# Patient Record
Sex: FEMALE | Race: BLACK OR AFRICAN AMERICAN | Employment: UNEMPLOYED | URBAN - METROPOLITAN AREA
[De-identification: names, ages, dates, MRNs, and addresses within clinical notes are randomized per-mention and may not be internally consistent; named-entity substitution may affect disease eponyms.]

---

## 2020-04-13 ENCOUNTER — VIRTUAL VISIT (OUTPATIENT)
Dept: FAMILY MEDICINE CLINIC | Age: 45
End: 2020-04-13

## 2020-04-13 VITALS — BODY MASS INDEX: 41.83 KG/M2 | WEIGHT: 245 LBS | HEIGHT: 64 IN

## 2020-04-13 DIAGNOSIS — M06.9 RHEUMATOID ARTHRITIS, INVOLVING UNSPECIFIED SITE, UNSPECIFIED RHEUMATOID FACTOR PRESENCE: ICD-10-CM

## 2020-04-13 DIAGNOSIS — J30.9 ALLERGIC RHINITIS, UNSPECIFIED SEASONALITY, UNSPECIFIED TRIGGER: Primary | ICD-10-CM

## 2020-04-13 RX ORDER — METHOTREXATE 2.5 MG/1
TABLET ORAL
COMMUNITY
Start: 2020-03-27

## 2020-04-13 RX ORDER — MONTELUKAST SODIUM 10 MG/1
10 TABLET ORAL DAILY
Qty: 90 TAB | Refills: 0 | Status: SHIPPED | OUTPATIENT
Start: 2020-04-13

## 2020-04-13 RX ORDER — FOLIC ACID 1 MG/1
TABLET ORAL
COMMUNITY
Start: 2020-03-20

## 2020-04-13 NOTE — PROGRESS NOTES
Chief Complaint   Patient presents with    Establish Care    Headache     1. Have you been to the ER, urgent care clinic since your last visit? Hospitalized since your last visit? No    2. Have you seen or consulted any other health care providers outside of the [address] [place] [address] since your last visit? Include any pap smears or colon screening.  No    Health Maintenance Due   Topic Date Due    DTaP/Tdap/Td series (1 - Tdap) 03/23/1994    PAP AKA CERVICAL CYTOLOGY  03/25/2018    Lipid Screen  10/25/2019

## 2020-04-13 NOTE — PROGRESS NOTES
Chief Complaint   Patient presents with    [facility] Care    Headache     Visit was completed today via doxy. me video platform. Pt was seen today to establish care. Pt reports that she has had some cough recently, has taken tylenol with some relief. Pt reports that she was recently diagnosed with RA, is currently on methotrexate and folic acid. Pt is treated by Dr. Radha Hagan. Pt reports that her hands bother her the most.     Pt used to be a patient of Dr. Nydia Cancino. Pt used to be on migraine medication, has not used a long time. Pt reports that current headache feels more like allergies, nasal congestion. Consent: Elena Bernardo, who was seen by synchronous (real-time) audio-video technology, and/or her healthcare decision maker, is aware that this patient-initiated, Telehealth encounter on 4/13/2020 is a billable service, with coverage as determined by her insurance carrier. She is aware that she may receive a bill and has provided verbal consent to proceed: Yes. Assessment & Plan:   Diagnoses and all orders for this visit:    1. Allergic rhinitis, unspecified seasonality, unspecified trigger  -start on medication as written  -     montelukast (SINGULAIR) 10 mg tablet; Take 1 Tab by mouth daily. 2. Rheumatoid arthritis, involving unspecified site, unspecified rheumatoid factor presence ([facility]  -follow up with Dr. Radha Hagan as scheduled, continue on current medications            712  Subjective:   Elena Bernardo is a 50 y.o. female who was seen for Establish Care and Headache      Prior to Admission medications    Medication Sig Start Date End Date Taking? Authorizing Provider   folic acid (FOLVITE) 1 mg tablet TAKE 3 TABLETS BY MOUTH ONCE DAILY 3/20/20  Yes Provider, Historical   methotrexate (RHEUMATREX) 2.5 mg tablet TAKE 10 TABLETS BY MOUTH ONCE A WEEK 3/27/20  Yes Provider, Historical   montelukast (SINGULAIR) 10 mg tablet Take 1 Tab by mouth daily.  4/13/20  Yes Arielle Quispe, MD   ibuprofen (MOTRIN) 800 mg tablet Take 1 Tab by mouth every eight (8) hours. 5/5/16 4/13/20  Katie Nash MD   oxyCODONE-acetaminophen (PERCOCET) 5-325 mg per tablet Take 1 Tab by mouth every four (4) hours as needed. Max Daily Amount: 6 Tabs. 5/5/16 4/13/20  Katie Nash MD   CALCIUM CARBONATE/VITAMIN D3 (CALCIUM + D PO) Take  by mouth daily. Provider, Historical   ferrous sulfate (SLOW FE) 142 mg (45 mg iron) ER tablet Take 142 mg by mouth Daily (before breakfast). Indications: IRON DEFICIENCY ANEMIA    Provider, Historical     Allergies   Allergen Reactions    Chlorhexidine Towelette Rash and Itching       Patient Active Problem List   Diagnosis Code    Menorrhagia N92.0       Review of Systems   Constitutional: Negative for chills and fever. HENT: Positive for congestion. Negative for hearing loss. Respiratory: Negative for cough. Cardiovascular: Negative for chest pain and palpitations. Gastrointestinal: Negative for heartburn, nausea and vomiting. Musculoskeletal: Negative for myalgias. Neurological: Negative for dizziness and headaches.            Objective:   Vital Signs: (As obtained by patient/caregiver at home)  Visit Vitals  Ht 5' 4\" (1.626 m)   Wt 245 lb (111.1 kg)   BMI 42.05 kg/m²        [INSTRUCTIONS:  \"[x]\" Indicates a positive item  \"[]\" Indicates a negative item  -- DELETE ALL ITEMS NOT EXAMINED]    Constitutional: [x] Appears well-developed and well-nourished [x] No apparent distress      [] Abnormal -     Mental status: [x] Alert and awake  [x] Oriented to person/place/time [x] Able to follow commands    [] Abnormal -     Eyes:   EOM    [x]  Normal    [] Abnormal -   Sclera  [x]  Normal    [] Abnormal -          Discharge [x]  None visible   [] Abnormal -     HENT: [x] Normocephalic, atraumatic  [] Abnormal -   [x] Mouth/Throat: Mucous membranes are moist    External Ears [x] Normal  [] Abnormal -    Neck: [x] No visualized mass [] Abnormal - Pulmonary/Chest: [x] Respiratory effort normal   [x] No visualized signs of difficulty breathing or respiratory distress        [] Abnormal -      Musculoskeletal:   [x] Normal gait with no signs of ataxia         [x] Normal range of motion of neck        [] Abnormal -     Neurological:        [x] No Facial Asymmetry (Cranial nerve 7 motor function) (limited exam due to video visit)          [x] No gaze palsy        [] Abnormal -          Skin:        [x] No significant exanthematous lesions or discoloration noted on facial skin         [] Abnormal -            Psychiatric:       [x] Normal Affect [] Abnormal -        [x] No Hallucinations    Other pertinent observable physical exam findings:-        We discussed the expected course, resolution and complications of the diagnosis(es) in detail. Medication risks, benefits, costs, interactions, and alternatives were discussed as indicated. I advised her to contact the office if her condition worsens, changes or fails to improve as anticipated. She expressed understanding with the diagnosis(es) and plan. Brett Mccain is a 50 y.o. female being evaluated by a video visit encounter for concerns as above. A caregiver was present when appropriate. Due to this being a TeleHealth encounter (During NKLQD-02 public health emergency), evaluation of the following organ systems was limited: Vitals/Constitutional/EENT/Resp/CV/GI//MS/Neuro/Skin/Heme-Lymph-Imm. Pursuant to the emergency declaration under the [facility], 1135 waiver authority and the [employer] General Act, this Virtual  Visit was conducted, with patient's (and/or legal guardian's) consent, to reduce the patient's risk of exposure to COVID-19 and provide necessary medical care. Services were provided through a video synchronous discussion virtually to substitute for in-person clinic visit.    Patient and provider were located at their individual homes.         Abdoul Flores MD

## 2020-07-28 ENCOUNTER — TELEPHONE (OUTPATIENT)
Dept: FAMILY MEDICINE CLINIC | Age: 45
End: 2020-07-28

## 2020-07-30 NOTE — TELEPHONE ENCOUNTER
Patient declined doing a virtual visit, she wanted to come in to be seen. I told her [facility] was booked for today but I could schedule her a virtual visit. She stated. \"no and hung up\".

## 2020-08-02 NOTE — TELEPHONE ENCOUNTER
Patient has a rash on her back, arms, stomach, hands. It comes and goes. She wants to see a provider. 29.9

## 2020-11-12 ENCOUNTER — OFFICE VISIT (OUTPATIENT)
Dept: FAMILY MEDICINE CLINIC | Age: 45
End: 2020-11-12
Payer: MEDICAID

## 2020-11-12 VITALS
DIASTOLIC BLOOD PRESSURE: 87 MMHG | WEIGHT: 246 LBS | TEMPERATURE: 97.8 F | BODY MASS INDEX: 42 KG/M2 | RESPIRATION RATE: 18 BRPM | SYSTOLIC BLOOD PRESSURE: 137 MMHG | HEIGHT: 64 IN | HEART RATE: 72 BPM | OXYGEN SATURATION: 97 %

## 2020-11-12 DIAGNOSIS — Z23 NEEDS FLU SHOT: Primary | ICD-10-CM

## 2020-11-12 DIAGNOSIS — E66.01 OBESITY, MORBID (HCC): ICD-10-CM

## 2020-11-12 DIAGNOSIS — E78.2 MIXED HYPERLIPIDEMIA: ICD-10-CM

## 2020-11-12 PROCEDURE — 99214 OFFICE O/P EST MOD 30 MIN: CPT | Performed by: FAMILY MEDICINE

## 2020-11-12 RX ORDER — LEFLUNOMIDE 20 MG/1
TABLET ORAL
COMMUNITY
Start: 2020-10-24

## 2020-11-12 RX ORDER — ATORVASTATIN CALCIUM 10 MG/1
10 TABLET, FILM COATED ORAL DAILY
Qty: 90 TAB | Refills: 1 | Status: SHIPPED | OUTPATIENT
Start: 2020-11-12

## 2020-11-12 NOTE — PROGRESS NOTES
Chief Complaint   Patient presents with    Cholesterol Problem     Pt is concerned about cholesterol report from Dr. Arrie Cushing. Pt was found to have LDL of 142. Pt reports that she does have a family history of elevated cholesterol she thinks in her mother. Pt agrees to a flu vaccine today. Pt reports that she is doing well otherwise. Subjective: (As above and below)     Chief Complaint   Patient presents with    Cholesterol Problem     she is a 50y.o. year old female who presents for evaluation. Reviewed PmHx, RxHx, FmHx, SocHx, AllgHx and updated in chart. Review of Systems - negative except as listed above    Objective:     Vitals:    11/12/20 0936   BP: 137/87   Pulse: 72   Resp: 18   Temp: 97.8 °F (36.6 °C)   TempSrc: Skin   SpO2: 97%   Weight: 246 lb (111.6 kg)   Height: 5' 4\" (1.626 m)     Physical Examination: General appearance - alert, well appearing, and in no distress  Mental status - normal mood, behavior, speech, dress, motor activity, and thought processes  Ears - bilateral TM's and external ear canals normal  Chest - clear to auscultation, no wheezes, rales or rhonchi, symmetric air entry  Heart - normal rate, regular rhythm, normal S1, S2, no murmurs, rubs, clicks or gallops  Musculoskeletal - no joint tenderness, deformity or swelling  Extremities - peripheral pulses normal, no pedal edema, no clubbing or cyanosis    Assessment/ Plan:   1. Obesity, morbid ([address]  -continue to work on diet and exercise    2. Needs flu shot  - INFLUENZA VIRUS VAC QUAD,SPLIT,PRESV FREE SYRINGE IM    3. Mixed hyperlipidemia  -start on lipitor as written, information provided   - atorvastatin (LIPITOR) 10 mg tablet; Take 1 Tab by mouth daily. Dispense: 90 Tab; Refill: 1      I have discussed the diagnosis with the patient and the intended plan as seen in the above orders. The patient has received an after-visit summary and questions were answered concerning future plans.      Medication Side Effects and Warnings were discussed with patient: yes  Patient Labs were reviewed: yes  Patient Past Records were reviewed:  yes    Serene Cheung M.D.

## 2020-11-12 NOTE — PATIENT INSTRUCTIONS
Vaccine Information Statement Influenza (Flu) Vaccine (Inactivated or Recombinant): What You Need to Know Many Vaccine Information Statements are available in Persian and other languages. See www.immunize.org/vis Hojas de información sobre vacunas están disponibles en español y en muchos otros idiomas. Visite www.immunize.org/vis 1. Why get vaccinated? Influenza vaccine can prevent influenza (flu). Flu is a contagious disease that spreads around the [facility] every year, usually between October and May. Anyone can get the flu, but it is more dangerous for some people. Infants and young children, people 72years of age and older, pregnant women, and people with certain health conditions or a weakened immune system are at greatest risk of flu complications. Pneumonia, bronchitis, sinus infections and ear infections are examples of flu-related complications. If you have a medical condition, such as heart disease, cancer or diabetes, flu can make it worse. Flu can cause fever and chills, sore throat, muscle aches, fatigue, cough, headache, and runny or stuffy nose. Some people may have vomiting and diarrhea, though this is more common in children than adults. Each year thousands of people in the [facility] die from flu, and many more are hospitalized. Flu vaccine prevents millions of illnesses and flu-related visits to the doctor each year. 2. Influenza vaccines CDC recommends everyone 10months of age and older get vaccinated every flu season. Children 6 months through 6years of age may need 2 doses during a single flu season. Everyone else needs only 1 dose each flu season. It takes about 2 weeks for protection to develop after vaccination. There are many flu viruses, and they are always changing. Each year a new flu vaccine is made to protect against three or four viruses that are likely to cause disease in the upcoming flu season.  Even when the vaccine doesnt exactly match these viruses, it may still provide some protection. Influenza vaccine does not cause flu. Influenza vaccine may be given at the same time as other vaccines. 3. Talk with your health care provider Tell your vaccine provider if the person getting the vaccine: 
 Has had an allergic reaction after a previous dose of influenza vaccine, or has any severe, life-threatening allergies.  Has ever had Guillain-Barré Syndrome (also called GBS). In some cases, your health care provider may decide to postpone influenza vaccination to a future visit. People with minor illnesses, such as a cold, may be vaccinated. People who are moderately or severely ill should usually wait until they recover before getting influenza vaccine. Your health care provider can give you more information. 4. Risks of a reaction  Soreness, redness, and swelling where shot is given, fever, muscle aches, and headache can happen after influenza vaccine.  There may be a very small increased risk of Guillain-Barré Syndrome (GBS) after inactivated influenza vaccine (the flu shot). [employer] children who get the flu shot along with pneumococcal vaccine (PCV13), and/or DTaP vaccine at the same time might be slightly more likely to have a seizure caused by fever. Tell your health care provider if a child who is getting flu vaccine has ever had a seizure. People sometimes faint after medical procedures, including vaccination. Tell your provider if you feel dizzy or have vision changes or ringing in the ears. As with any medicine, there is a very remote chance of a vaccine causing a severe allergic reaction, other serious injury, or death. 5. What if there is a serious problem? An allergic reaction could occur after the vaccinated person leaves the clinic.  If you see signs of a severe allergic reaction (hives, swelling of the face and throat, difficulty breathing, a fast heartbeat, dizziness, or weakness), call 9-1-1 and get the person to the nearest hospital. 
 
For other signs that concern you, call your health care provider. Adverse reactions should be reported to the Vaccine Adverse Event Reporting System (VAERS). Your health care provider will usually file this report, or you can do it yourself. Visit the VAERS website at www.vaers. hhs.gov or call 2-200.699.6213. VAERS is only for reporting reactions, and VAERS staff do not give medical advice. 6. The National Vaccine Injury Compensation Program 
 
The [facility] Vaccine Injury Compensation Program (VICP) is a federal program that was created to compensate people who may have been injured by certain vaccines. Visit the VICP website at www.[facility].gov/vaccinecompensation or call 2-102.168.5047 to learn about the program and about filing a claim. There is a time limit to file a claim for compensation. 7. How can I learn more?  Ask your health care provider.  Call your local or state health department.  Contact the Centers for Disease Control and Prevention (CDC): 
- Call 8-428.379.4580 (1-800-CDC-INFO) or 
- Visit CDCs influenza website at www.cdc.gov/flu Vaccine Information Statement (Interim) Inactivated Influenza Vaccine 8/15/2019 
[address] CATHLE[address] Marvin [id number] Department of Health and [employer] Centers for Disease Control and Prevention Office Use Only High Cholesterol: Care Instructions Your Care Instructions Cholesterol is a type of fat in your blood. It is needed for many body functions, such as making new cells. Cholesterol is made by your body. It also comes from food you eat. High cholesterol means that you have too much of the fat in your blood. This raises your risk of a heart attack and stroke. LDL and HDL are part of your total cholesterol. LDL is the \"bad\" cholesterol. High LDL can raise your risk for heart disease, heart attack, and stroke. HDL is the \"good\" cholesterol.  It helps clear bad cholesterol from the body. High HDL is linked with a lower risk of heart disease, heart attack, and stroke. Your cholesterol levels help your doctor find out your risk for having a heart attack or stroke. You and your doctor can talk about whether you need to lower your risk and what treatment is best for you. A heart-healthy lifestyle along with medicines can help lower your cholesterol and your risk. The way you choose to lower your risk will depend on how high your risk is for heart attack and stroke. It will also depend on how you feel about taking medicines. Follow-up care is a key part of your treatment and safety. Be sure to make and go to all appointments, and call your doctor if you are having problems. It's also a good idea to know your test results and keep a list of the medicines you take. How can you care for yourself at home? · Eat a variety of foods every day. Good choices include fruits, vegetables, whole grains (like oatmeal), dried beans and peas, nuts and seeds, soy products (like tofu), and fat-free or low-fat dairy products. · Replace butter, margarine, and hydrogenated or partially hydrogenated oils with olive and canola oils. (Canola oil margarine without trans fat is fine.) · Replace red meat with fish, poultry, and soy protein (like tofu). · Limit processed and packaged foods like chips, crackers, and cookies. · Bake, broil, or steam foods. Don't contreras them. · Be physically active. Get at least 30 minutes of exercise on most days of the week. Walking is a good choice. You also may want to do other activities, such as running, swimming, cycling, or playing tennis or team sports. · Stay at a healthy weight or lose weight by making the changes in eating and physical activity listed above. Losing just a small amount of weight, even 5 to 10 pounds, can reduce your risk for having a heart attack or stroke. · Do not smoke. When should you call for help? Watch closely for changes in your health, and be sure to contact your doctor if: 
  · You need help making lifestyle changes.  
  · You have questions about your medicine. Where can you learn more? Go to http://www.gray.com/ Enter M367 in the search box to learn more about \"High Cholesterol: Care Instructions. \" Current as of: December 16, 2019               Content Version: 12.6 © 1811-5508 [employer]. Care instructions adapted under license by [employer] (which disclaims liability or warranty for this information). If you have questions about a medical condition or this instruction, always ask your healthcare professional. [address] any warranty or liability for your use of this information. Cholesterol and Triglycerides Tests: About These Tests What are they? Cholesterol and triglycerides tests measure the amount of fats in your blood. These fats have both \"good\" (HDL) and \"bad\" (LDL) cholesterol. Why are these tests done? These tests are done to help find out your risk of a heart attack and stroke. Your doctor uses your cholesterol levels plus other things such as blood pressure, age, and sex to calculate your risk. These tests also help your doctor find out how well medicine is working for some health problems. How do you prepare for these tests? · Your doctor may tell you to fast before your tests. This means that you do not eat or drink anything except water for 9 to 12 hours before the tests. In most cases, you can take your medicines with water the morning of the test. 
· Do not eat high-fat foods the night before the tests. · Do not drink alcohol or do intense exercise the night before the tests. · Tell your doctor ALL the medicines, vitamins, supplements, and herbal remedies you take.  Some may increase the risk of problems during your test. Your doctor will tell you if you should stop taking any of them before the test and how soon to do it. How are these tests done? A health professional uses a needle to take a blood sample, usually from the arm. Where can you learn more? Go to http://www.gray.com/ Enter M891 in the search box to learn more about \"Cholesterol and Triglycerides Tests: About These Tests. \" Current as of: December 16, 2019               Content Version: 12.6 © 8685-5943 [employer]. Care instructions adapted under license by [employer] (which disclaims liability or warranty for this information). If you have questions about a medical condition or this instruction, always ask your healthcare professional. [address] any warranty or liability for your use of this information.

## 2020-11-12 NOTE — PROGRESS NOTES
Anaya Marina is a 50 y.o. female   Chief Complaint   Patient presents with    Cholesterol Problem     1. Have you been to the ER, urgent care clinic since your last visit? Hospitalized since your last visit?no    2. Have you seen or consulted any other health care providers outside of the [address] [place] [address] since your last visit? Dr Lester Hicks any pap smears or colon screening. No  Health Maintenance   Topic Date Due    PAP AKA CERVICAL CYTOLOGY  03/25/2018    Lipid Screen  10/25/2019    Flu Vaccine (1) 09/01/2020    DTaP/Tdap/Td series (2 - Td) 01/01/2026    Pneumococcal 0-64 years  Aged Out     Visit Vitals  /87 (BP 1 Location: Left arm, BP Patient Position: At rest)   Pulse 72   Temp 97.8 °F (36.6 °C) (Skin)   Resp 18   Ht 5' 4\" (1.626 m)   Wt 246 lb (111.6 kg)   SpO2 97%   BMI 42.23 kg/m²         .

## 2020-11-23 PROCEDURE — 90686 IIV4 VACC NO PRSV 0.5 ML IM: CPT | Performed by: FAMILY MEDICINE

## 2020-11-23 PROCEDURE — 90471 IMMUNIZATION ADMIN: CPT | Performed by: FAMILY MEDICINE

## 2021-03-11 ENCOUNTER — TELEPHONE (OUTPATIENT)
Dept: FAMILY MEDICINE CLINIC | Age: 46
End: 2021-03-11

## 2021-03-11 DIAGNOSIS — Z20.1 EXPOSURE TO TB: Primary | ICD-10-CM

## 2021-03-11 NOTE — TELEPHONE ENCOUNTER
Pt is calling wanting to get an order for TB wants sent to [facility] in [address] [facility]

## 2021-03-18 LAB
GAMMA INTERFERON BACKGROUND BLD IA-ACNC: 0.12 IU/ML
M TB IFN-G BLD-IMP: NEGATIVE
M TB IFN-G CD4+ BCKGRND COR BLD-ACNC: 0.14 IU/ML
MITOGEN IGNF BLD-ACNC: >10 IU/ML
QUANTIFERON INCUBATION, QF1T: NORMAL
QUANTIFERON TB2 AG: 0.11 IU/ML
SERVICE CMNT-IMP: NORMAL

## 2021-03-25 ENCOUNTER — OFFICE VISIT (OUTPATIENT)
Dept: FAMILY MEDICINE CLINIC | Age: 46
End: 2021-03-25
Payer: MEDICAID

## 2021-03-25 VITALS
OXYGEN SATURATION: 96 % | HEIGHT: 64 IN | RESPIRATION RATE: 17 BRPM | TEMPERATURE: 97.3 F | WEIGHT: 235 LBS | BODY MASS INDEX: 40.12 KG/M2 | HEART RATE: 80 BPM | DIASTOLIC BLOOD PRESSURE: 85 MMHG | SYSTOLIC BLOOD PRESSURE: 135 MMHG

## 2021-03-25 DIAGNOSIS — E78.2 MIXED HYPERLIPIDEMIA: ICD-10-CM

## 2021-03-25 DIAGNOSIS — E66.01 OBESITY, MORBID (HCC): Primary | ICD-10-CM

## 2021-03-25 PROCEDURE — 99214 OFFICE O/P EST MOD 30 MIN: CPT | Performed by: FAMILY MEDICINE

## 2021-03-25 NOTE — PROGRESS NOTES
Chief Complaint   Patient presents with    Cholesterol Problem     follow up    Suture Removal     Pt is here today for a fasting follow up on her cholesterol and other labs. Pt had a biopsy 8 days ago, with her dermatologist, needs a suture removed. Pt sees a rheumatologist, was advised that her LFTs were slightly elevated. Subjective: (As above and below)     Chief Complaint   Patient presents with    Cholesterol Problem     follow up    Suture Removal     she is a 48y.o. year old female who presents for evaluation. Reviewed PmHx, RxHx, FmHx, SocHx, AllgHx and updated in chart. Review of Systems - negative except as listed above    Objective:     Vitals:    03/25/21 0925   BP: 135/85   Pulse: 80   Resp: 17   Temp: 97.3 °F (36.3 °C)   TempSrc: Temporal   SpO2: 96%   Weight: 235 lb (106.6 kg)   Height: 5' 4\" (1.626 m)     Physical Examination: General appearance - alert, well appearing, and in no distress  Mental status - normal mood, behavior, speech, dress, motor activity, and thought processes  Ears - bilateral TM's and external ear canals normal  Chest - clear to auscultation, no wheezes, rales or rhonchi, symmetric air entry  Heart - normal rate, regular rhythm, normal S1, S2, no murmurs, rubs, clicks or gallops  Musculoskeletal - no joint tenderness, deformity or swelling  Extremities - peripheral pulses normal, no pedal edema, no clubbing or cyanosis  Skin - healing well - suture removed  Assessment/ Plan:   1. Obesity, morbid ([address]  Continue to work on diet and exercise    2. Mixed hyperlipidemia  -check labs  - METABOLIC PANEL, COMPREHENSIVE; Future  - CBC W/O DIFF; Future  - LIPID PANEL; Future  - HEMOGLOBIN A1C WITH EAG; Future  - TSH 3RD GENERATION; Future  - METABOLIC PANEL, COMPREHENSIVE  - CBC W/O DIFF  - LIPID PANEL  - HEMOGLOBIN A1C WITH EAG  - TSH 3RD GENERATION  - HEPATITIS C AB; Future  - HEPATITIS C AB    3.  Suture removal  -one stitch removed       I have discussed the diagnosis with the patient and the intended plan as seen in the above orders. The patient has received an after-visit summary and questions were answered concerning future plans.      Medication Side Effects and Warnings were discussed with patient: yes  Patient Labs were reviewed: yes  Patient Past Records were reviewed:  yes    Thierry Villaseñor M.D.

## 2021-03-25 NOTE — PROGRESS NOTES
1. Have you been to the ER, urgent care clinic since your last visit? Hospitalized since your last visit? No    2. Have you seen or consulted any other health care providers outside of the [address] [place] [address] since your last visit? Include any pap smears or colon screening.  NO    Health Maintenance Due   Topic Date Due    Hepatitis C Screening  Never done    COVID-19 Vaccine (1) Never done

## 2021-03-26 LAB
ALBUMIN SERPL-MCNC: 4.3 G/DL (ref 3.8–4.8)
ALBUMIN/GLOB SERPL: 1.3 {RATIO} (ref 1.2–2.2)
ALP SERPL-CCNC: 72 IU/L (ref 39–117)
ALT SERPL-CCNC: 36 IU/L (ref 0–32)
AST SERPL-CCNC: 20 IU/L (ref 0–40)
BILIRUB SERPL-MCNC: 0.4 MG/DL (ref 0–1.2)
BUN SERPL-MCNC: 6 MG/DL (ref 6–24)
BUN/CREAT SERPL: 9 (ref 9–23)
CALCIUM SERPL-MCNC: 8.8 MG/DL (ref 8.7–10.2)
CHLORIDE SERPL-SCNC: 102 MMOL/L (ref 96–106)
CHOLEST SERPL-MCNC: 202 MG/DL (ref 100–199)
CO2 SERPL-SCNC: 26 MMOL/L (ref 20–29)
CREAT SERPL-MCNC: 0.65 MG/DL (ref 0.57–1)
ERYTHROCYTE [DISTWIDTH] IN BLOOD BY AUTOMATED COUNT: 13.8 % (ref 11.7–15.4)
EST. AVERAGE GLUCOSE BLD GHB EST-MCNC: 120 MG/DL
GLOBULIN SER CALC-MCNC: 3.2 G/DL (ref 1.5–4.5)
GLUCOSE SERPL-MCNC: 99 MG/DL (ref 65–99)
HBA1C MFR BLD: 5.8 % (ref 4.8–5.6)
HCT VFR BLD AUTO: 41.4 % (ref 34–46.6)
HCV AB S/CO SERPL IA: <0.1 S/CO RATIO (ref 0–0.9)
HDLC SERPL-MCNC: 53 MG/DL
HGB BLD-MCNC: 13.8 G/DL (ref 11.1–15.9)
IMP & REVIEW OF LAB RESULTS: NORMAL
LDLC SERPL CALC-MCNC: 132 MG/DL (ref 0–99)
MCH RBC QN AUTO: 29.7 PG (ref 26.6–33)
MCHC RBC AUTO-ENTMCNC: 33.3 G/DL (ref 31.5–35.7)
MCV RBC AUTO: 89 FL (ref 79–97)
PLATELET # BLD AUTO: 237 X10E3/UL (ref 150–450)
POTASSIUM SERPL-SCNC: 3.9 MMOL/L (ref 3.5–5.2)
PROT SERPL-MCNC: 7.5 G/DL (ref 6–8.5)
RBC # BLD AUTO: 4.64 X10E6/UL (ref 3.77–5.28)
SODIUM SERPL-SCNC: 140 MMOL/L (ref 134–144)
TRIGL SERPL-MCNC: 93 MG/DL (ref 0–149)
TSH SERPL DL<=0.005 MIU/L-ACNC: 1.34 UIU/ML (ref 0.45–4.5)
VLDLC SERPL CALC-MCNC: 17 MG/DL (ref 5–40)
WBC # BLD AUTO: 3.1 X10E3/UL (ref 3.4–10.8)

## 2021-04-03 NOTE — PROGRESS NOTES
Cholesterol is elevated, please closely monitor diet and increase exercise, recheck in 6 months. Increase in risk for diabetes, please closely monitor diet and increase exercise   All other labs are within normal limits. A message has been sent in [employer] and the lab work released to the patient.

## 2021-04-13 DIAGNOSIS — G62.9 NEUROPATHY: Primary | ICD-10-CM

## 2021-04-13 RX ORDER — GABAPENTIN 100 MG/1
100 CAPSULE ORAL 3 TIMES DAILY
Qty: 90 CAP | Refills: 1 | Status: SHIPPED | OUTPATIENT
Start: 2021-04-13 | End: 2021-09-27 | Stop reason: ALTCHOICE

## 2021-04-26 ENCOUNTER — TELEPHONE (OUTPATIENT)
Dept: FAMILY MEDICINE CLINIC | Age: 46
End: 2021-04-26

## 2021-04-26 DIAGNOSIS — R05.9 COUGH: Primary | ICD-10-CM

## 2021-04-26 RX ORDER — BENZONATATE 200 MG/1
200 CAPSULE ORAL
Qty: 30 CAP | Refills: 0 | Status: SHIPPED | OUTPATIENT
Start: 2021-04-26 | End: 2021-05-03

## 2021-04-26 NOTE — TELEPHONE ENCOUNTER
----- Message from Emeterio Pena sent at 4/26/2021  8:16 AM EDT -----  Regarding: Non-Urgent Medical Question  Contact: [phone]  I went to the ER on Saturday for cold/ chills and headache,I'm feeling better but I'm coughing off and on so I was wondering if you could call something in to help me with the coughing.     Thank you

## 2021-06-23 ENCOUNTER — OFFICE VISIT (OUTPATIENT)
Dept: NEUROLOGY | Age: 46
End: 2021-06-23
Payer: MEDICAID

## 2021-06-23 VITALS
SYSTOLIC BLOOD PRESSURE: 151 MMHG | HEIGHT: 65 IN | DIASTOLIC BLOOD PRESSURE: 87 MMHG | BODY MASS INDEX: 38.65 KG/M2 | HEART RATE: 69 BPM | WEIGHT: 232 LBS

## 2021-06-23 DIAGNOSIS — E53.8 VITAMIN B12 DEFICIENCY: ICD-10-CM

## 2021-06-23 DIAGNOSIS — R20.2 PARESTHESIA: Primary | ICD-10-CM

## 2021-06-23 DIAGNOSIS — G44.019 EPISODIC CLUSTER HEADACHE, NOT INTRACTABLE: ICD-10-CM

## 2021-06-23 DIAGNOSIS — G62.9 NEUROPATHY: ICD-10-CM

## 2021-06-23 DIAGNOSIS — E55.9 VITAMIN D DEFICIENCY: ICD-10-CM

## 2021-06-23 PROCEDURE — 99205 OFFICE O/P NEW HI 60 MIN: CPT | Performed by: PSYCHIATRY & NEUROLOGY

## 2021-06-23 RX ORDER — MELATONIN
DAILY
COMMUNITY

## 2021-06-23 RX ORDER — LANOLIN ALCOHOL/MO/W.PET/CERES
1000 CREAM (GRAM) TOPICAL DAILY
COMMUNITY

## 2021-06-23 RX ORDER — ERGOCALCIFEROL 1.25 MG/1
50000 CAPSULE ORAL
COMMUNITY
Start: 2021-06-18 | End: 2021-09-10

## 2021-06-23 NOTE — PROGRESS NOTES
Neurology Consult Note      HISTORY PROVIDED BY: patient    Chief Complaint:   Chief Complaint   Patient presents with    New Patient    Tingling      Subjective:    Domingo Taylor is a 48 y.o. right handed female who presents in consultation for numbness and tingling sensation of the lower extremity. This is a 43-year-old right-handed black female with history of rheumatoid arthritis, who was referred to the clinic to evaluate for numbness and tingling sensation of the lower extremity. Patient says she noticed the numbness and tingling sensation of the legs about 2 months ago and since then has been progressive. Patient said while walking on it barefoot sometimes therapy as a result she is walking on carina, she says she also feels as if something is crawling in her arm legs. She says there is no pain, no fall. Initially she thought it would get better but instead it is getting worse. Because of the progression, patient was referred for neurological evaluation. Patient also says she experiences headache, headache is not very frequent, however, when the headache comes it lasts 2 to 3 days. Headache is throbbing in nature, mostly frontal but can be on the sides of the head. She says she has been having this headache for about 3 years. There is occasional dizziness, no nausea or vomiting photophobia phonophobia.   Review of Systems - General ROS: positive for  - fatigue and sleep disturbance  Psychological ROS: positive for - anxiety and sleep disturbances  Ophthalmic ROS: positive for - blurry vision  ENT ROS: positive for - headaches, vertigo and visual changes  Allergy and Immunology ROS: negative  Hematological and Lymphatic ROS: negative  Endocrine ROS: negative  Respiratory ROS: no cough, shortness of breath, or wheezing  Cardiovascular ROS: no chest pain or dyspnea on exertion  Gastrointestinal ROS: no abdominal pain, change in bowel habits, or black or bloody stools  Genito-Urinary ROS: no dysuria, trouble voiding, or hematuria  Musculoskeletal ROS: positive for - joint pain, joint stiffness, muscle pain and muscular weakness  Neurological ROS: positive for - dizziness, headaches, numbness/tingling, visual changes and weakness  Dermatological ROS: negative  Patient says that she recommended to be done by PCP which was said to be 163, if patient will need replacement by injection. Past Medical History:   Diagnosis Date    RA (rheumatoid arthritis) ([facility] [address] [facility]       Past Surgical History:   Procedure Laterality Date    HX GYN      vaginal delivery x2    HX TUBAL LIGATION Bilateral       Social History     Socioeconomic History    Marital status: UNKNOWN     Spouse name: Not on file    Number of children: Not on file    Years of education: Not on file    Highest education level: Not on file   Occupational History    Not on file   Tobacco Use    Smoking status: Never Smoker    Smokeless tobacco: Never Used   Vaping Use    Vaping Use: Never used   Substance and Sexual Activity    Alcohol use: Yes     Comment: occasionally    Drug use: No    Sexual activity: Not Currently   Other Topics Concern    Not on file   Social History Narrative    Not on file     Social Determinants of Health     Financial Resource Strain:     Difficulty of Paying Living Expenses:    Food Insecurity:     Worried About Running Out of Food in the Last Year:     [address] in the Last Year:    Transportation Needs:     Lack of Transportation (Medical):      Lack of Transportation (Non-Medical):    Physical Activity:     Days of Exercise per Week:     Minutes of Exercise per Session:    Stress:     Feeling of Stress :    Social Connections:     Frequency of Communication with Friends and Family:     Frequency of Social Gatherings with Friends and Family:     Attends Denominational Services:     Active Member of Clubs or Organizations:     Attends Club or Organization Meetings:     Marital Status:    Intimate Partner Violence:     Fear of Current or Ex-Partner:     Emotionally Abused:     Physically Abused:     Sexually Abused:      Family History   Problem Relation Age of Onset    Hypertension Mother     Hypertension Father     Cancer Maternal Grandmother     Cancer Maternal Grandfather          Objective:   ROS  As per HPI  Allergies   Allergen Reactions    Chlorhexidine Towelette Rash and Itching        Meds:  Outpatient Medications Prior to Visit   Medication Sig Dispense Refill    ergocalciferol (ERGOCALCIFEROL) 1,250 mcg (50,000 unit) capsule Take 50,000 Units by mouth.  cyanocobalamin (Vitamin B-12) 1,000 mcg tablet Take 1,000 mcg by mouth daily.  cholecalciferol (Vitamin D3) (1000 Units /25 mcg) tablet Take  by mouth daily.  atorvastatin (LIPITOR) 10 mg tablet Take 1 Tab by mouth daily. 90 Tab 1    folic acid (FOLVITE) 1 mg tablet TAKE 3 TABLETS BY MOUTH ONCE DAILY      methotrexate (RHEUMATREX) 2.5 mg tablet TAKE 10 TABLETS BY MOUTH ONCE A WEEK      gabapentin (NEURONTIN) 100 mg capsule Take 1 Cap by mouth three (3) times daily. Max Daily Amount: 300 mg. (Patient not taking: Reported on 6/23/2021) 90 Cap 1    leflunomide (ARAVA) 20 mg tablet TAKE 1 TABLET BY MOUTH ONCE DAILY LABS DUE 4 WEEKS AFTER STARTING (Patient not taking: Reported on 6/23/2021)      montelukast (SINGULAIR) 10 mg tablet Take 1 Tab by mouth daily. (Patient not taking: Reported on 6/23/2021) 90 Tab 0    CALCIUM CARBONATE/VITAMIN D3 (CALCIUM + D PO) Take  by mouth daily. (Patient not taking: Reported on 6/23/2021)      ferrous sulfate (SLOW FE) 142 mg (45 mg iron) ER tablet Take 142 mg by mouth Daily (before breakfast). Indications: IRON DEFICIENCY ANEMIA (Patient not taking: Reported on 6/23/2021)       No facility-administered medications prior to visit. Imaging:  MRI Results (most recent):  No results found for this or any previous visit.      CT Results (most recent):  No results found for this or any previous visit. Reviewed records in [facility] tab today    Lab Review   Results for orders placed or performed in visit on 30/36/54   METABOLIC PANEL, COMPREHENSIVE   Result Value Ref Range    Glucose 99 65 - 99 mg/dL    BUN 6 6 - 24 mg/dL    Creatinine 0.65 0.57 - 1.00 mg/dL    GFR est non- >59 mL/min/1.73    GFR est  >59 mL/min/1.73    BUN/Creatinine ratio 9 9 - 23    Sodium 140 134 - 144 mmol/L    Potassium 3.9 3.5 - 5.2 mmol/L    Chloride 102 96 - 106 mmol/L    CO2 26 20 - 29 mmol/L    Calcium 8.8 8.7 - 10.2 mg/dL    Protein, total 7.5 6.0 - 8.5 g/dL    Albumin 4.3 3.8 - 4.8 g/dL    GLOBULIN, TOTAL 3.2 1.5 - 4.5 g/dL    A-G Ratio 1.3 1.2 - 2.2    Bilirubin, total 0.4 0.0 - 1.2 mg/dL    Alk. phosphatase 72 39 - 117 IU/L    AST (SGOT) 20 0 - 40 IU/L    ALT (SGPT) 36 (H) 0 - 32 IU/L   CBC W/O DIFF   Result Value Ref Range    WBC 3.1 (L) 3.4 - 10.8 x10E3/uL    RBC 4.64 3.77 - 5.28 x10E6/uL    HGB 13.8 11.1 - 15.9 g/dL    HCT 41.4 34.0 - 46.6 %    MCV 89 79 - 97 fL    MCH 29.7 26.6 - 33.0 pg    MCHC 33.3 31.5 - 35.7 g/dL    RDW 13.8 11.7 - 15.4 %    PLATELET 581 355 - 685 x10E3/uL   LIPID PANEL   Result Value Ref Range    Cholesterol, total 202 (H) 100 - 199 mg/dL    Triglyceride 93 0 - 149 mg/dL    HDL Cholesterol 53 >39 mg/dL    VLDL, calculated 17 5 - 40 mg/dL    LDL, calculated 132 (H) 0 - 99 mg/dL   HEMOGLOBIN A1C WITH EAG   Result Value Ref Range    Hemoglobin A1c 5.8 (H) 4.8 - 5.6 %    Estimated average glucose 120 mg/dL   TSH 3RD GENERATION   Result Value Ref Range    TSH 1.340 0.450 - 4.500 uIU/mL   HEPATITIS C AB   Result Value Ref Range    Hep C Virus Ab <0.1 0.0 - 0.9 s/co ratio   CVD REPORT   Result Value Ref Range    INTERPRETATION Note         Exam:  Visit Vitals  BP (!) 151/87   Pulse 69   Ht 5' 5\" (1.651 m)   Wt 232 lb (105.2 kg)   LMP 04/15/2016   BMI 38.61 kg/m²     General:  Alert, cooperative, no distress. Head:  Normocephalic, without obvious abnormality, atraumatic. Respiratory:  Heart:   Non labored breathing  Regular rate and rhythm, no murmurs   Neck:   2+ carotids, no bruits   Extremities: Warm, no cyanosis or edema. Pulses: 2+ radial pulses. Neurologic:  MS: Alert and oriented x 4, speech intact. Language intact, able to name, repeat, and follow all commands. Attention and fund of knowledge appropriate. Recent and remote memory intact. Cranial Nerves:  II: visual fields Full to confrontation   II: pupils Equal, round, reactive to light   II: optic disc No papilledema   III,VII: ptosis none   III,IV,VI: extraocular muscles  EOMI, no nystagmus or diplopia   V: facial light touch sensation  normal   VII: facial muscle function   symmetric   VIII: hearing intact   IX: soft palate elevation  normal   XI: trapezius strength  5/5   XI: sternocleidomastoid strength 5/5   XII: tongue  Midline     Motor: normal bulk and tone, no tremor              Strength: 5/5 throughout, no PD  Sensory: Decreased sensation t to LT, PP, temperature and vibration bilateral lower extremity up to half of the leg  Coordination: FTN and HTS intact, KRISTYN intact,Romberg negative  Gait: normal gait, able to heel, toe, and tandem walk  Reflexes: 1+ symmetric  Plantar: Mute           Assessment/Plan       ICD-10-CM ICD-9-CM    1. Paresthesia  R20.2 782.0 EMG NCV MOTOR WITH F/WAVE PER NERVE   2. Neuropathy  G62.9 355.9 EMG NCV MOTOR WITH F/WAVE PER NERVE   3. Vitamin B12 deficiency  E53.8 266.2    4. Vitamin D deficiency  E55.9 268.9    5. Episodic cluster headache, not intractable  G44.019 339.01 CT HEAD WO CONT   Plan:  Vitamin B12 1000 mcg IM q. weekly for 4 weeks then q. monthly  Head CT to evaluate for this relatively new onset headache  EMG/nerve conduction study bilateral lower extremity. Follow-up and Dispositions    · Return in about 3 months (around 9/23/2021). Thank you very much for this consultation.          Signed:  Clemencia Wiggins MD  6/23/2021

## 2021-06-29 ENCOUNTER — OFFICE VISIT (OUTPATIENT)
Dept: NEUROLOGY | Age: 46
End: 2021-06-29
Payer: MEDICAID

## 2021-06-29 DIAGNOSIS — R20.2 PARESTHESIA: ICD-10-CM

## 2021-06-29 DIAGNOSIS — G62.9 NEUROPATHY: Primary | ICD-10-CM

## 2021-06-29 PROCEDURE — 95886 MUSC TEST DONE W/N TEST COMP: CPT | Performed by: PSYCHIATRY & NEUROLOGY

## 2021-06-29 PROCEDURE — 95910 NRV CNDJ TEST 7-8 STUDIES: CPT | Performed by: PSYCHIATRY & NEUROLOGY

## 2021-06-29 NOTE — PROCEDURES
[facility]  [address], [address], [address]  p: [phone]  f: [phone]    Test Date:  2021    Patient: Satish Stuart : 1975 Physician: Dr. Saul Severance   Sex: Female Height:  cm Ref Phys: Dr. Saul Severance   ID#: [id number] Weight:  lbs. Technician: Justina JIMENEZ     Patient Complaints: Numbness and tingling sensation of the lower extremity, low back pain      Medications: See chart      Patient History / Exam: This is a 53-year-old black female who is being evaluated for numbness and tingling sensation of the extremity, weakness and pain      NCV & EMG Findings:  All nerve conduction studies (as indicated in the following tables) were within normal limits. All examined muscles (as indicated in the following table) showed no evidence of electrical instability. Impression: There is no electrodiagnostic evidence of peripheral neuropathy, however, radiculopathy versus small fiber neuropathy cannot be excluded.       Recommendations: Neuro imaging of the lumbosacral spine suggested      ___________________________  Dr. Saul Severance        Nerve Conduction Studies  Anti Sensory Summary Table     Stim Site NR Peak (ms) Norm Peak (ms) O-P Amp (µV) Norm O-P Amp Site1 Site2 Delta-0 (ms) Dist (cm) Pop (m/s) Norm Pop (m/s)   Left Sup Fibular Anti Sensory (Ant Lat Mall)  33.4°C   14 cm    2.9 <4.4 7.6 >6 14 cm Ant Lat Mall 2.2 10.0 45    Right Sup Fibular Anti Sensory (Ant Lat Mall)  33.4°C   14 cm    3.4 <4.4 6.3 >6 14 cm Ant Lat Mall 2.5 10.0 40    Left Sural Anti Sensory (Lat Mall)  33.4°C   Calf    3.8 <4.4 10.5 >6 Calf Lat Mall 2.8 14.0 50    Right Sural Anti Sensory (Lat Mall)  33.4°C   Calf    3.6 <4.4 6.7 >6 Calf Lat Mall 3.0 14.0 47      Motor Summary Table     Stim Site NR Onset (ms) Norm Onset (ms) O-P Amp (mV) Norm O-P Amp Site1 Site2 Delta-0 (ms) Dist (cm) Pop (m/s) Norm Pop (m/s)   Left Fibular Motor (Ext Dig Brev)  33.4°C   Ankle    4.1 <6.5 6.7 >1.3 B Fib Ankle 5.9 29.0 49 >38   B Fib    10.0  6.2  Poplt B Fib 1.9 10.0 53 >40   Poplt    11.9  5.7          Right Fibular Motor (Ext Dig Brev)  33.4°C   Ankle    4.2 <6.5 10.6 >1.3 B Fib Ankle 6.0 27.5 46 >38   B Fib    10.2  9.8  Poplt B Fib 1.8 10.0 56 >40   Poplt    12.0  9.7          Left Tibial Motor (Abd Vee Brev)  33.4°C   Ankle    3.8 <6.1 12.1 >4.4 Knee Ankle 7.5 39.0 52 >39   Knee    11.3  9.5          Right Tibial Motor (Abd Vee Brev)  33.4°C   Ankle    5.4 <6.1 9.9 >4.4 Knee Ankle 7.6 38.0 50 >39   Knee    13.0  8.8            EMG+     Side Muscle Nerve Root Ins Act Fibs Psw Amp Dur Poly Recrt Int Arno Reamer Comment   Right VastusMed Femoral L2-4 Nml Nml Nml Nml Nml 0 Nml Nml    Right VastusLat Femoral L2-4 Nml Nml Nml Nml Nml 0 Nml Nml    Right QuadratusFem QuadFemoris L4-5, S1 Nml Nml Nml Nml Nml 0 Nml Nml    Right AntTibialis Dp Br Fibular L4-5 Nml Nml Nml Nml Nml 0 Nml Nml    Right Fibularis Long Sup Br Fibular L5-S1 Nml Nml Nml Nml Nml 0 Nml Nml        Nerve Conduction Studies  Anti Sensory Left/Right Comparison     Stim Site L Lat (ms) R Lat (ms) L-R Lat (ms) L Amp (µV) R Amp (µV) L-R Amp (%) Site1 Site2 L Pop (m/s) R Pop (m/s) L-R Pop (m/s)   Sup Fibular Anti Sensory (Ant Lat Mall)  33.4°C   14 cm 2.9 3.4 0.5 7.6 6.3 17.1 14 cm Ant Lat Mall 45 40 5   Sural Anti Sensory (Lat Mall)  33.4°C   Calf 3.8 3.6 0.2 10.5 6.7 36.2 Calf Lat Mall 50 47 3     Motor Left/Right Comparison     Stim Site L Lat (ms) R Lat (ms) L-R Lat (ms) L Amp (mV) R Amp (mV) L-R Amp (%) Site1 Site2 L Pop (m/s) R Pop (m/s) L-R Pop (m/s)   Fibular Motor (Ext Dig Brev)  33.4°C   Ankle 4.1 4.2 0.1 6.7 10.6 36.8 B Fib Ankle 49 46 3   B Fib 10.0 10.2 0.2 6.2 9.8 36.7 Poplt B Fib 53 56 3   Poplt 11.9 12.0 0.1 5.7 9.7 41.2        Tibial Motor (Abd Vee Brev)  33.4°C   Ankle 3.8 5.4 1.6 12.1 9.9 18.2 Knee Ankle 52 50 2   Knee 11.3 13.0 1.7 9.5 8.8 7.4              Waveforms:

## 2021-07-12 ENCOUNTER — TELEPHONE (OUTPATIENT)
Dept: NEUROLOGY | Age: 46
End: 2021-07-12

## 2021-07-14 ENCOUNTER — TELEPHONE (OUTPATIENT)
Dept: NEUROLOGY | Age: 46
End: 2021-07-14

## 2021-07-14 NOTE — TELEPHONE ENCOUNTER
----- Message from Tresea Holstein sent at 7/14/2021 12:07 PM EDT -----  Regarding: /Telephone  General Message/Vendor Calls    Caller's first and last name: Nasra Avitia      Reason for call: speak wit the nurse      Callback required yes/no and why: yes      Best contact number(s): [phone] or [phone]      Details to clarify the request: Pt requesting to speak with the nurse to go over nerve testing results .       Tresea Holstein

## 2021-07-20 ENCOUNTER — TELEPHONE (OUTPATIENT)
Dept: NEUROLOGY | Age: 46
End: 2021-07-20

## 2021-07-20 NOTE — TELEPHONE ENCOUNTER
----- Message from [facility] sent at 7/20/2021 12:30 PM EDT -----  Regarding: /Telephone     Caller's first and last name:Pt  Reason for call:Pt wants to know if her Emg results are available.   Callback required yes/no and why:Yes  Best contact number(s):[phone] or [phone]  Details to clarify the request:n/a

## 2021-09-27 ENCOUNTER — VIRTUAL VISIT (OUTPATIENT)
Dept: NEUROLOGY | Age: 46
End: 2021-09-27
Payer: MEDICAID

## 2021-09-27 DIAGNOSIS — R20.2 PARESTHESIA: Primary | ICD-10-CM

## 2021-09-27 DIAGNOSIS — G62.9 NEUROPATHY: ICD-10-CM
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
## 2021-09-27 DIAGNOSIS — G44.009 MIGRAINE-CLUSTER HEADACHE SYNDROME: ICD-10-CM

## 2021-09-27 DIAGNOSIS — G44.019 EPISODIC CLUSTER HEADACHE, NOT INTRACTABLE: ICD-10-CM

## 2021-09-27 DIAGNOSIS — G37.8 OTHER SPECIFIED DEMYELINATING DISEASES OF CENTRAL NERVOUS SYSTEM (HCC): ICD-10-CM

## 2021-09-27 PROCEDURE — 99214 OFFICE O/P EST MOD 30 MIN: CPT | Performed by: PSYCHIATRY & NEUROLOGY

## 2021-09-27 RX ORDER — PREGABALIN 50 MG/1
50 CAPSULE ORAL 2 TIMES DAILY
Qty: 60 CAPSULE | Refills: 1 | Status: SHIPPED | OUTPATIENT
Start: 2021-09-27

## 2021-09-27 RX ORDER — RIMEGEPANT SULFATE 75 MG/75MG
TABLET, ORALLY DISINTEGRATING ORAL
Qty: 8 TABLET | Refills: 1 | Status: SHIPPED | OUTPATIENT
Start: 2021-09-27 | End: 2021-10-19 | Stop reason: CLARIF

## 2021-09-27 NOTE — PROGRESS NOTES
Neurology Progress Note  Wisam Burciaga was seen by synchronous (real-time) audio-video technology on 21. Consent:  She and/or her healthcare decision maker is aware that this patient-initiated Telehealth encounter is a billable service, with coverage as determined by her insurance carrier. She is aware that she may receive a bill and has provided verbal consent to proceed: Yes    I was in the office while conducting this encounter. Pursuant to the emergency declaration under the [facility] [place] [address] waiver authority and the [employer] Act, this Virtual  Visit was conducted, with patient's consent, to reduce the patient's risk of exposure to COVID-19 and provide continuity of care for an established patient. Services were provided through a video synchronous discussion virtually to substitute for in-person clinic visit. NAME:  Wisam Burciaga   :   1975   MRN:   [id number]     Date/Time:  2021  Subjective:      Wisam Burciaga is a 48 y.o. female here today for follow-up headache numbness and tingling sensation of the lower extremity, test results. Patient says she still experiencing the numbness and tingling sensation of the extremities, even though it is more the lower extremity, she also says there is numbness and tingling sensation in the upper extremity. She says numbness and tingling sensation tend to come and go. EMG/nerve conduction study of the lower extremity was unremarkable for peripheral neuropathy. Patient also noted that she has been having a lot of headaches, frequency of headaches is variable, however most of the times it is about 2-3 times a week. Headache is holocephalic, throbbing in nature, stated with dizziness, blurry vision, occasional nausea but no vomiting. Patient says with the headache the numbness and tingling sensation tend to be worse.   She has been tried on different medications without much improvement. Patient noted that because of the headache she went to the emergency room, CT scan of the head was obtained, I did not see the report, was unable also to pull up the test.  Because of the nature of the patient's headache, associated with numbness and tingling sensation, I will obtain MRI of the brain without gadolinium. Blood work ordered was not done, I will reorder blood work. Patient says gabapentin has not been helping the numbness and tingling sensation, I will switch patient to Lyrica. Patient was given Nurtec for headaches. Review of Systems - General ROS: positive for  - fatigue and sleep disturbance  Psychological ROS: positive for - anxiety and sleep disturbances  Ophthalmic ROS: positive for - blurry vision  ENT ROS: positive for - headaches, vertigo and visual changes  Allergy and Immunology ROS: negative  Hematological and Lymphatic ROS: negative  Endocrine ROS: negative  Respiratory ROS: no cough, shortness of breath, or wheezing  Cardiovascular ROS: no chest pain or dyspnea on exertion  Gastrointestinal ROS: no abdominal pain, change in bowel habits, or black or bloody stools  Genito-Urinary ROS: no dysuria, trouble voiding, or hematuria  Musculoskeletal ROS: positive for - joint pain, joint stiffness, muscle pain and muscular weakness  Neurological ROS: positive for - dizziness, headaches, numbness/tingling, visual changes and weakness  Dermatological ROS: negative        Medications reviewed:  Current Outpatient Medications   Medication Sig Dispense Refill    cyanocobalamin (Vitamin B-12) 1,000 mcg tablet Take 1,000 mcg by mouth daily.  cholecalciferol (Vitamin D3) (1000 Units /25 mcg) tablet Take  by mouth daily.  atorvastatin (LIPITOR) 10 mg tablet Take 1 Tab by mouth daily.  90 Tab 1    folic acid (FOLVITE) 1 mg tablet TAKE 3 TABLETS BY MOUTH ONCE DAILY      methotrexate (RHEUMATREX) 2.5 mg tablet TAKE 10 TABLETS BY MOUTH ONCE A WEEK      cyanocobalamin, vitamin B-12, 1,000 mcg/mL kit Vit B12 1000mcg im q wkly x4 doses, then q2 weekly x4 doses and evaluate (Patient not taking: Reported on 9/27/2021) 8 Kit 0    gabapentin (NEURONTIN) 100 mg capsule Take 1 Cap by mouth three (3) times daily. Max Daily Amount: 300 mg. (Patient not taking: Reported on 6/23/2021) 90 Cap 1    leflunomide (ARAVA) 20 mg tablet TAKE 1 TABLET BY MOUTH ONCE DAILY LABS DUE 4 WEEKS AFTER STARTING (Patient not taking: Reported on 6/23/2021)      montelukast (SINGULAIR) 10 mg tablet Take 1 Tab by mouth daily. (Patient not taking: Reported on 6/23/2021) 90 Tab 0    CALCIUM CARBONATE/VITAMIN D3 (CALCIUM + D PO) Take  by mouth daily. (Patient not taking: Reported on 6/23/2021)      ferrous sulfate (SLOW FE) 142 mg (45 mg iron) ER tablet Take 142 mg by mouth Daily (before breakfast). Indications: IRON DEFICIENCY ANEMIA (Patient not taking: Reported on 6/23/2021)          Objective:   Vitals: There were no vitals filed for this visit.             Lab Data Reviewed:  Lab Results   Component Value Date/Time    WBC 3.1 (L) 03/25/2021 10:20 AM    HCT 41.4 03/25/2021 10:20 AM    HGB 13.8 03/25/2021 10:20 AM    PLATELET 267 91/21/3809 10:20 AM       Lab Results   Component Value Date/Time    Sodium 140 03/25/2021 10:20 AM    Potassium 3.9 03/25/2021 10:20 AM    Chloride 102 03/25/2021 10:20 AM    CO2 26 03/25/2021 10:20 AM    Glucose 99 03/25/2021 10:20 AM    BUN 6 03/25/2021 10:20 AM    Creatinine 0.65 03/25/2021 10:20 AM    Calcium 8.8 03/25/2021 10:20 AM       No components found for: TROPQUANT    No results found for: BRITTANY      Lab Results   Component Value Date/Time    Hemoglobin A1c 5.8 (H) 03/25/2021 10:20 AM    Hemoglobin A1c, External 0.0 10/31/2020 12:00 AM        No results found for: B12LT, FOL, RBCF    No results found for: BRITTANY, Noni Pellant, XBANA    Lab Results   Component Value Date/Time    Cholesterol, total 202 (H) 03/25/2021 10:20 AM    HDL Cholesterol 53 03/25/2021 10:20 AM    LDL, calculated 132 (H) 03/25/2021 10:20 AM    LDL, calculated 148.6 (H) 10/25/2014 09:30 AM    VLDL, calculated 17 03/25/2021 10:20 AM    VLDL, calculated 25.4 10/25/2014 09:30 AM    Triglyceride 93 03/25/2021 10:20 AM    CHOL/HDL Ratio 4.2 10/25/2014 09:30 AM         CT Results (recent):  No results found for this or any previous visit. MRI Results (recent):  No results found for this or any previous visit. IR Results (recent):  No results found for this or any previous visit. VAS/US Results (recent):  No results found for this or any previous visit. PHYSICAL EXAM:  General:    Alert, cooperative, no distress, appears stated age. Head:   Normocephalic, without obvious abnormality, atraumatic. Eyes:   Conjunctivae/corneas clear. Nose:  Nares normal. .  Throat:    Lips,and tongue normal.  No Thrush  Neck:  Supple, symmetrical,  no adenopathy, thyroid: non tender     no JVD. Back:    Symmetric. Lungs:   Deferred. Chest wall:   No Accessory muscle use. Heart:   Deferred. Abdomen:    Not distended. Extremities: Extremities normal, atraumatic, No cyanosis. No edema. No clubbing  Skin:      No rashes or lesions. Not Jaundiced  Lymph nodes: Cervical, supraclavicular normal.  Psych:  Good insight. Not depressed. Not anxious or agitated. NEUROLOGICAL EXAM:  Appearance: The patient is well developed, well nourished, provides a coherent history and is in no acute distress. Mental Status: Oriented to time, place and person. Mood and affect appropriate. Cranial Nerves:   Intact visual fields. EOM's full, no nystagmus, no ptosis. . Facial movement is symmetric. Hearing is normal bilaterally. . Tongue is midline. Motor:   Moves all extremities. Normal bulk . No fasciculations. Reflexes:   Deferred. Sensory:   Deferred. Gait:  Normal gait. Tremor:   No tremor noted. Cerebellar:  No cerebellar signs present. Assesment  1. Paresthesia   Lyrica    2. Migraine-cluster headache syndrome  Nurtec    3. Episodic cluster headache, not intractable  Noted    4. 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 Neuropathy  Lyrica    ___________________________________________________  PLAN: Medication and plan discussed with patient      ICD-10-CM ICD-9-CM    1. Paresthesia  R20.2 782.0    2. Migraine-cluster headache syndrome  G44.009 339.00    3. Episodic cluster headache, not intractable  G44.019 339.01    4. 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 Neuropathy  G62.9 355.9      Follow-up and Dispositions    · Return in about 2 months (around 11/27/2021).              ___________________________________________________    Attending Physician: Souleymane Chisholm MD

## 2021-09-30 ENCOUNTER — TELEPHONE (OUTPATIENT)
Dept: NEUROLOGY | Age: 46
End: 2021-09-30

## 2021-09-30 NOTE — TELEPHONE ENCOUNTER
----- [facility] sent at 9/30/2021  1:11 PM EDT -----  Regarding: Nazanin Grimaldo MD  General Message/Vendor Calls    Caller's first and last name:      Reason for call: Patient was Suppose to get MRI done , she wants get it done at [facility]  its a little  closer to her , Cheryl fu Prior Authorization for a medicine suppose to take       Callback required yes/no and why: yes       Best contact number(s):[phone]      Details to clarify the request:      [facility]

## 2021-10-01 ENCOUNTER — TELEPHONE (OUTPATIENT)
Dept: NEUROLOGY | Age: 46
End: 2021-10-01

## 2021-10-01 NOTE — TELEPHONE ENCOUNTER
Nurtec PA sent to [facility] w/ notes. Nadeen Kwok) - [phone]    Status is pending.

## 2021-10-02 ENCOUNTER — TELEPHONE (OUTPATIENT)
Dept: NEUROLOGY | Age: 46
End: 2021-10-02

## 2021-10-02 NOTE — TELEPHONE ENCOUNTER
Nurtec denied w/ [employer]. Elex Likens is preferred on Medicaid plans.      Dr. Rylie Lei,    Do you want to revise it to Ubrelvy?   (10 for 30 days)

## 2021-10-11 NOTE — TELEPHONE ENCOUNTER
----- Message from Jose Miguel Chandra sent at 10/11/2021 12:24 PM EDT -----  Regarding: /Telephone  General Message/Vendor Calls         Caller's first and last name: Self               Reason for call: Pt needs returned call about MRI Appt , and Ubrevley prescription               Callback required yes/no and why: Yes               Best contact number(s): [phone]              Details to clarify the request: Pt needs returned call about MRI Appt , and Ubrevley prescription                   Jose Miguel Chandra

## 2021-10-14 NOTE — TELEPHONE ENCOUNTER
I called the patient:  She wants to know if we can set up the MRI for [facility] since that is closer to her. I told her that she has to call their imaging to set this up. She was also told that Klaudia Flirt was preferred instead of the Nurteq that was sent in. I have set this up for you.  Will you please sign off on this medication

## 2021-10-19 ENCOUNTER — TELEPHONE (OUTPATIENT)
Dept: NEUROLOGY | Age: 46
End: 2021-10-19

## 2021-10-19 NOTE — TELEPHONE ENCOUNTER
Dr. Dottie Estrella,     [facility] and did you want to revise it to Ubrelvy. Minna Sable is preferred for government plans. Patient is calling asking about it.      Note     Nurtec denied w/ anthem hk plus.      Minna Sable is preferred on Medicaid plans.      Dr. Dottie Estrella,     Do you want to revise it to Ubrelvy?   (10 for 30 days)            BB    10/2/21 11:27 AM  You routed this conversation to Sam Lopez MD

## 2021-10-21 LAB
ACE SERPL-CCNC: 31 U/L (ref 14–82)
ALBUMIN SERPL ELPH-MCNC: 4 G/DL (ref 2.9–4.4)
ALBUMIN/GLOB SERPL: 1.3 {RATIO} (ref 0.7–1.7)
ALDOLASE SERPL-CCNC: 4.9 U/L (ref 3.3–10.3)
ALPHA1 GLOB SERPL ELPH-MCNC: 0.2 G/DL (ref 0–0.4)
ALPHA2 GLOB SERPL ELPH-MCNC: 0.5 G/DL (ref 0.4–1)
ANA SER QL: NEGATIVE
B BURGDOR IGG PATRN SER IB-IMP: NEGATIVE
B BURGDOR IGM PATRN SER IB-IMP: NEGATIVE
B BURGDOR18KD IGG SER QL IB: NORMAL
B BURGDOR23KD IGG SER QL IB: NORMAL
B BURGDOR23KD IGM SER QL IB: NORMAL
B BURGDOR28KD IGG SER QL IB: NORMAL
B BURGDOR30KD IGG SER QL IB: NORMAL
B BURGDOR39KD IGG SER QL IB: NORMAL
B BURGDOR39KD IGM SER QL IB: NORMAL
B BURGDOR41KD IGG SER QL IB: NORMAL
B BURGDOR41KD IGM SER QL IB: NORMAL
B BURGDOR45KD IGG SER QL IB: NORMAL
B BURGDOR58KD IGG SER QL IB: NORMAL
B BURGDOR66KD IGG SER QL IB: NORMAL
B BURGDOR93KD IGG SER QL IB: NORMAL
B-GLOBULIN SERPL ELPH-MCNC: 0.9 G/DL (ref 0.7–1.3)
CK SERPL-CCNC: 282 U/L (ref 32–182)
ERYTHROCYTE [SEDIMENTATION RATE] IN BLOOD BY WESTERGREN METHOD: 27 MM/HR (ref 0–32)
GAMMA GLOB SERPL ELPH-MCNC: 1.6 G/DL (ref 0.4–1.8)
GLOBULIN SER CALC-MCNC: 3.1 G/DL (ref 2.2–3.9)
M PROTEIN SERPL ELPH-MCNC: 0.3 G/DL
PLEASE NOTE, 011150: ABNORMAL
PROT SERPL-MCNC: 7.1 G/DL (ref 6–8.5)
RHEUMATOID FACT SERPL-ACNC: <10 IU/ML (ref 0–13.9)

## 2021-10-25 ENCOUNTER — TELEPHONE (OUTPATIENT)
Dept: NEUROLOGY | Age: 46
End: 2021-10-25

## 2021-10-29 ENCOUNTER — TELEPHONE (OUTPATIENT)
Dept: NEUROLOGY | Age: 46
End: 2021-10-29

## 2021-11-11 ENCOUNTER — TELEPHONE (OUTPATIENT)
Dept: NEUROLOGY | Age: 46
End: 2021-11-11

## 2021-11-16 ENCOUNTER — TELEPHONE (OUTPATIENT)
Dept: NEUROLOGY | Age: 46
End: 2021-11-16

## 2021-11-16 NOTE — TELEPHONE ENCOUNTER
Elissa AMIN sent to [place] via [facility] -     Needs trial of two triptans. I can locate one - sumatriptan only. Note uploaded for [facility] review. (Key: [id number])   Status is pending.

## 2021-11-18 ENCOUNTER — TELEPHONE (OUTPATIENT)
Dept: NEUROLOGY | Age: 46
End: 2021-11-18

## 2021-11-18 NOTE — TELEPHONE ENCOUNTER
[facility] [place] denied - as noted earlier, requires trial of two triptans. I can only locate one triptan trial.     PA Case: [id number], Status: Denied. Notification: Completed.

## 2021-11-20 ENCOUNTER — TELEPHONE (OUTPATIENT)
Dept: NEUROLOGY | Age: 46
End: 2021-11-20

## 2021-11-20 NOTE — TELEPHONE ENCOUNTER
[facility] in [address] to see if pt has had her MRI or has a future date scheduled.  transferred call but no answer in that department. Will try back on Monday, 11/23.

## 2021-11-23 ENCOUNTER — TELEPHONE (OUTPATIENT)
Dept: NEUROLOGY | Age: 46
End: 2021-11-23

## 2021-11-23 NOTE — TELEPHONE ENCOUNTER
Re: MRI Brain     The patient had requested to have her MRI performed at [facility].  When checking with [facility] via [employer] - this facility is out of her network. I checked the [facility] this is IN-Network with her plan. The cost at this free standing facility historically has been approximately 1/2 of the cost of having done in a hospital outpatient setting. The patient lives near [facility] and this would be a greater distance, but substantial savings for her. I entered the information on [employer] and response was must be submitted to [facility] by calling [phone]. Before submitting this, I will reach out to patient to find out if she wants to proceed with this location. I called and s/w pt and she requests for study to be done in [facility] since it's closer and  \"if the cost is cheaper\". I stated I do not know what it will cost, but the first course of action is to work on the approval.      2. Patient also wanted to know the status of her Elex Likens - I told her it was denied because [place] requires TWO triptans and she has tried one. She would like to try something for her headaches since she cannot get the Ubrelvy at this time. 3.  She wants to know the results of recent labs also. She stated she calls but no one ever calls her back. I told her Dr. Rylie Lei was not in the office but I would forward her questions and concerns to one of our other physicians in the office as I was not a nurse and could not review those requests with her. She understood and thanked me for the call. I told her I would follow up with [facility] on Monday re: MRI and let her know their response.

## 2021-11-25 NOTE — TELEPHONE ENCOUNTER
Hi,    Please follow-up with your patient on Monday in regards to her questions from testing done in the past.    Thanks

## 2021-11-30 ENCOUNTER — TELEPHONE (OUTPATIENT)
Dept: NEUROLOGY | Age: 46
End: 2021-11-30

## 2021-11-30 ENCOUNTER — DOCUMENTATION ONLY (OUTPATIENT)
Dept: NEUROLOGY | Age: 46
End: 2021-11-30

## 2021-11-30 NOTE — TELEPHONE ENCOUNTER
Re: MRI Brain w/w/o  CPT 39895    Called in clinicals to [address] [place] [address]. This is location patient prefers:  [facility];   NPI #[id number]  [address]  Phone: [phone]. Fax: [phone]    11/30/21 - 1/28/21. Faxed MRI order, last office note of 9/27/21 to [facility] requesting them to call pt to schedule the MRI. Ph [phone] (feel free to give pt this number and she can also reach out to the hospital to schedule it). You may also give her the auth number and date range as they may not have that information yet. I have faxed it and received the fax confirmation page just now. All scanned to Media.

## 2021-11-30 NOTE — PROGRESS NOTES
RE: Katrin Juarez 100mg tablet denied by [facility] [employer]. Denial letter forwarded to . Copy sent to  to be scanned into media.

## 2021-12-02 NOTE — TELEPHONE ENCOUNTER
Send [employer] message regarding approved authorization for MRI. Patient given direction to contact preferred hospital (U) to schedule MRI.

## 2022-01-05 ENCOUNTER — TELEPHONE (OUTPATIENT)
Dept: NEUROLOGY | Age: 47
End: 2022-01-05

## 2022-01-17 ENCOUNTER — VIRTUAL VISIT (OUTPATIENT)
Dept: NEUROLOGY | Age: 47
End: 2022-01-17
Payer: MEDICAID

## 2022-01-17 DIAGNOSIS — G43.719 INTRACTABLE CHRONIC MIGRAINE WITHOUT AURA AND WITHOUT STATUS MIGRAINOSUS: ICD-10-CM

## 2022-01-17 DIAGNOSIS — G37.9 DEMYELINATING DISEASE (HCC): ICD-10-CM

## 2022-01-17 DIAGNOSIS — R42 DIZZINESS: ICD-10-CM

## 2022-01-17 DIAGNOSIS — G44.009 MIGRAINE-CLUSTER HEADACHE SYNDROME: ICD-10-CM

## 2022-01-17 DIAGNOSIS — R20.2 PARESTHESIA: Primary | ICD-10-CM

## 2022-01-17 PROCEDURE — 99214 OFFICE O/P EST MOD 30 MIN: CPT | Performed by: PSYCHIATRY & NEUROLOGY

## 2022-01-17 RX ORDER — ALBUTEROL SULFATE 90 UG/1
2 AEROSOL, METERED RESPIRATORY (INHALATION)
COMMUNITY
Start: 2021-04-12 | End: 2022-04-12

## 2022-01-17 RX ORDER — SULFASALAZINE 500 MG/1
TABLET ORAL
COMMUNITY
Start: 2021-11-13

## 2022-01-17 RX ORDER — CLOBETASOL PROPIONATE 0.5 MG/G
CREAM TOPICAL
COMMUNITY

## 2022-01-17 NOTE — PROGRESS NOTES
Neurology Progress Note  Mago Pedro was seen by synchronous (real-time) audio-video technology on 22. Consent:  She and/or her healthcare decision maker is aware that this patient-initiated Telehealth encounter is a billable service, with coverage as determined by her insurance carrier. She is aware that she may receive a bill and has provided verbal consent to proceed: Yes    I was in the office while conducting this encounter. Pursuant to the emergency declaration under the [facility] [place] [address] waiver authority and the [employer] Act, this Virtual  Visit was conducted, with patient's consent, to reduce the patient's risk of exposure to COVID-19 and provide continuity of care for an established patient. Services were provided through a video synchronous discussion virtually to substitute for in-person clinic visit. NAME:  Mago Pedro   :   1975   MRN:   [id number]     Date/Time:  2022  Subjective:    Mago Pedro is a 48 y.o. female here today for follow-up headache numbness and tingling sensation of the lower extremity, test results. Patient says she continues to have numbness and tingling sensation of the extremities, even though it is more the lower extremity, she also says there is numbness and tingling sensation in the upper extremity. She says numbness and tingling sensation tend to come and go. It should be noted that EMG/nerve conduction study performed on this patient was unremarkable for peripheral neuropathy  With the numbness and tingling sensation of the extremity which comes and goes,there is need to obtain MRI of the brain with and without gadolinium to evaluate for demyelinating disease  Patient also continues to have a lot of headaches, frequency of headaches is variable, however most of the times it is about 2-3 times a week.   Headache is holocephalic, throbbing in nature, stated with dizziness, blurry vision, occasional nausea but no vomiting. Patient says with the headache the numbness tingling sensation has gotten worse  Blood work reviewed with patient was unremarkable except for slight presence of M spike 0.3. As patient has been tried on different prophylaxis for headache, and she is scared of injections, I will use Nurtec for both prophylaxis and abortive treatment. Patient has been tried on the following Topamax, propanolol, Elavil, Zonegran. Review of Systems - General ROS: positive for  - fatigue and sleep disturbance  Psychological ROS: positive for - anxiety and sleep disturbances  Ophthalmic ROS: positive for - blurry vision  ENT ROS: positive for - headaches, vertigo and visual changes  Allergy and Immunology ROS: negative  Hematological and Lymphatic ROS: negative  Endocrine ROS: negative  Respiratory ROS: no cough, shortness of breath, or wheezing  Cardiovascular ROS: no chest pain or dyspnea on exertion  Gastrointestinal ROS: no abdominal pain, change in bowel habits, or black or bloody stools  Genito-Urinary ROS: no dysuria, trouble voiding, or hematuria  Musculoskeletal ROS: positive for - joint pain, joint stiffness, muscle pain and muscular weakness  Neurological ROS: positive for - dizziness, headaches, numbness/tingling, visual changes and weakness  Dermatological ROS: negative        Medications reviewed:  Current Outpatient Medications   Medication Sig Dispense Refill    albuterol (PROVENTIL HFA, VENTOLIN HFA, PROAIR HFA) 90 mcg/actuation inhaler Take 2 Puffs by inhalation every six (6) hours as needed.  pregabalin (LYRICA) 50 mg capsule Take 1 Capsule by mouth two (2) times a day. Max Daily Amount: 100 mg. 60 Capsule 1    atorvastatin (LIPITOR) 10 mg tablet Take 1 Tab by mouth daily.  90 Tab 1    folic acid (FOLVITE) 1 mg tablet TAKE 3 TABLETS BY MOUTH ONCE DAILY      methotrexate (RHEUMATREX) 2.5 mg tablet TAKE 10 TABLETS BY MOUTH ONCE A WEEK      montelukast (SINGULAIR) 10 mg tablet Take 1 Tab by mouth daily. 90 Tab 0    CALCIUM CARBONATE/VITAMIN D3 (CALCIUM + D PO) Take  by mouth daily.  clobetasoL (TEMOVATE) 0.05 % topical cream clobetasol 0.05 % topical cream      sulfaSALAzine (AZULFIDINE) 500 mg tablet       ubrogepant (Ubrelvy) 100 mg tablet Take 1 tablet for the onset of severe headache, not to exceed 1 dose in 24 hours. (Patient not taking: Reported on 1/17/2022) 10 Tablet 3    cyanocobalamin (Vitamin B-12) 1,000 mcg tablet Take 1,000 mcg by mouth daily. (Patient not taking: Reported on 1/17/2022)      cholecalciferol (Vitamin D3) (1000 Units /25 mcg) tablet Take  by mouth daily. (Patient not taking: Reported on 1/17/2022)      cyanocobalamin, vitamin B-12, 1,000 mcg/mL kit Vit B12 1000mcg im q wkly x4 doses, then q2 weekly x4 doses and evaluate (Patient not taking: Reported on 9/27/2021) 8 Kit 0    leflunomide (ARAVA) 20 mg tablet TAKE 1 TABLET BY MOUTH ONCE DAILY LABS DUE 4 WEEKS AFTER STARTING (Patient not taking: Reported on 6/23/2021)      ferrous sulfate (SLOW FE) 142 mg (45 mg iron) ER tablet Take 142 mg by mouth Daily (before breakfast). Indications: IRON DEFICIENCY ANEMIA (Patient not taking: Reported on 6/23/2021)          Objective:   Vitals: There were no vitals filed for this visit.             Lab Data Reviewed:  Lab Results   Component Value Date/Time    WBC 3.1 (L) 03/25/2021 10:20 AM    HCT 41.4 03/25/2021 10:20 AM    HGB 13.8 03/25/2021 10:20 AM    PLATELET 428 37/21/1112 10:20 AM       Lab Results   Component Value Date/Time    Sodium 140 03/25/2021 10:20 AM    Potassium 3.9 03/25/2021 10:20 AM    Chloride 102 03/25/2021 10:20 AM    CO2 26 03/25/2021 10:20 AM    Glucose 99 03/25/2021 10:20 AM    BUN 6 03/25/2021 10:20 AM    Creatinine 0.65 03/25/2021 10:20 AM    Calcium 8.8 03/25/2021 10:20 AM       No components found for: TROPQUANT    No results found for: BRITTANY      Lab Results   Component Value Date/Time Hemoglobin A1c 5.8 (H) 03/25/2021 10:20 AM    Hemoglobin A1c, External 0.0 10/31/2020 12:00 AM        No results found for: B12LT, FOL, RBCF    No results found for: Odell SOTO XBANA    Lab Results   Component Value Date/Time    Cholesterol, total 202 (H) 03/25/2021 10:20 AM    HDL Cholesterol 53 03/25/2021 10:20 AM    LDL, calculated 132 (H) 03/25/2021 10:20 AM    LDL, calculated 148.6 (H) 10/25/2014 09:30 AM    VLDL, calculated 17 03/25/2021 10:20 AM    VLDL, calculated 25.4 10/25/2014 09:30 AM    Triglyceride 93 03/25/2021 10:20 AM    CHOL/HDL Ratio 4.2 10/25/2014 09:30 AM         CT Results (recent):  No results found for this or any previous visit. MRI Results (recent):  No results found for this or any previous visit. IR Results (recent):  No results found for this or any previous visit. VAS/US Results (recent):  No results found for this or any previous visit. PHYSICAL EXAM:  General:    Alert, cooperative, no distress, appears stated age. Head:   Normocephalic, without obvious abnormality, atraumatic. Eyes:   Conjunctivae/corneas clear. Nose:  Nares normal. .  Throat:    Lips,and tongue normal.  No Thrush  Neck:  Supple, symmetrical,  no adenopathy, thyroid: non tender     no JVD. Back:    Symmetric. Lungs:   Deferred. Chest wall:   No Accessory muscle use. Heart:   Deferred. Abdomen:    Not distended. Extremities: Extremities normal, atraumatic, No cyanosis. No edema. No clubbing  Skin:      No rashes or lesions. Not Jaundiced  Lymph nodes: Cervical, supraclavicular normal.  Psych:  Good insight. Not depressed. Not anxious or agitated. NEUROLOGICAL EXAM:  Appearance: The patient is well developed, well nourished, provides a coherent history and is in no acute distress. Mental Status: Oriented to time, place and person. Mood and affect appropriate. Cranial Nerves:   Intact visual fields. EOM's full, no nystagmus, no ptosis.  . Facial movement is symmetric. Hearing is normal bilaterally. . Tongue is midline. Motor:   Moves all extremities. Normal bulk . No fasciculations. Reflexes:   Deferred. Sensory:   Deferred. Gait:  Normal gait. Tremor:   No tremor noted. Cerebellar:  No cerebellar signs present. Assesment  1. Paresthesia  Neurontin    2. Migraine-cluster headache syndrome  Nurtec    3. Dizziness  MRI of the brain  4. Intractable chronic migraine without aura and without status migrainosus  Nurtec    5. Demyelinating disease ([facility]  MRI of the brain with and without gadolinium    ___________________________________________________  PLAN: Medication and plan discussed with patient      ICD-10-CM ICD-9-CM    1. Paresthesia  R20.2 782.0    2. Migraine-cluster headache syndrome  G44.009 339.00    3. Dizziness  R42 780.4    4. Intractable chronic migraine without aura and without status migrainosus  G43.719 346.71    5. Demyelinating disease ([facility]  G37.9 341.9      Follow-up and Dispositions    · Return in about 3 months (around 4/17/2022).                ___________________________________________________    Attending Physician: Melissa Michelle MD

## 2022-01-31 ENCOUNTER — TELEPHONE (OUTPATIENT)
Dept: NEUROLOGY | Age: 47
End: 2022-01-31

## 2022-01-31 NOTE — TELEPHONE ENCOUNTER
Patient would like a new order for the MRI to be sent to [facility] in [address]. Pt did not have any contact nbrs.  This will be closer to her home

## 2022-02-03 DIAGNOSIS — R20.2 PARESTHESIA: Primary | ICD-10-CM

## 2022-02-03 DIAGNOSIS — G44.311 INTRACTABLE ACUTE POST-TRAUMATIC HEADACHE: ICD-10-CM

## 2022-02-03 DIAGNOSIS — G37.9 DEMYELINATING DISEASE (HCC): ICD-10-CM

## 2022-03-19 PROBLEM — E66.01 OBESITY, MORBID (HCC): Status: ACTIVE | Noted: 2020-11-12

## 2022-03-24 ENCOUNTER — TELEPHONE (OUTPATIENT)
Dept: NEUROLOGY | Age: 47
End: 2022-03-24

## 2022-03-24 NOTE — TELEPHONE ENCOUNTER
Reference to my documentation of 21:     The MRI Brain authorization was done in November and was good to 22 for the [address] [facility] location (U). I cannot tell if patient was informed of this location as a My chart notice was sent and now the authorization has , unless pt has had the test.     I tried calling today  - [facility]  in [place], [place]  Address: [place], [address] [place] [address], [address] [place] [address] Dr    Phone: [phone]  And the line rings a fast busy. Please physically call (/not a [employer] message? as pt may not  those messages and I want to ensure she knew that the [facility] was the location of where the authorization was obtained, to identify if she has had the test and if not, I will need to obtain a new authorization from her plan. Thank you!

## 2024-04-29 RX ORDER — IBUPROFEN 600 MG/1
600 TABLET ORAL EVERY 6 HOURS PRN
COMMUNITY

## 2024-04-29 RX ORDER — HYDROXYCHLOROQUINE SULFATE 200 MG/1
200 TABLET, FILM COATED ORAL 2 TIMES DAILY
COMMUNITY

## 2024-04-29 RX ORDER — NAPROXEN 250 MG/1
250 TABLET ORAL 2 TIMES DAILY PRN
COMMUNITY

## 2024-04-29 RX ORDER — CYCLOBENZAPRINE HCL 10 MG
10 TABLET ORAL 3 TIMES DAILY PRN
COMMUNITY

## 2024-04-30 ENCOUNTER — HOSPITAL ENCOUNTER (OUTPATIENT)
Facility: HOSPITAL | Age: 49
Setting detail: OUTPATIENT SURGERY
Discharge: HOME | End: 2024-04-30
Attending: STUDENT IN AN ORGANIZED HEALTH CARE EDUCATION/TRAINING PROGRAM | Admitting: STUDENT IN AN ORGANIZED HEALTH CARE EDUCATION/TRAINING PROGRAM
Payer: MEDICAID

## 2024-04-30 ENCOUNTER — ANESTHESIA (OUTPATIENT)
Facility: HOSPITAL | Age: 49
End: 2024-04-30
Payer: MEDICAID

## 2024-04-30 ENCOUNTER — ANESTHESIA EVENT (OUTPATIENT)
Facility: HOSPITAL | Age: 49
End: 2024-04-30
Payer: MEDICAID

## 2024-04-30 VITALS
TEMPERATURE: 97.6 F | DIASTOLIC BLOOD PRESSURE: 81 MMHG | HEIGHT: 64 IN | BODY MASS INDEX: 39.16 KG/M2 | WEIGHT: 229.4 LBS | SYSTOLIC BLOOD PRESSURE: 114 MMHG | OXYGEN SATURATION: 98 % | RESPIRATION RATE: 15 BRPM | HEART RATE: 85 BPM

## 2024-04-30 PROCEDURE — 3700000001 HC ADD 15 MINUTES (ANESTHESIA): Performed by: STUDENT IN AN ORGANIZED HEALTH CARE EDUCATION/TRAINING PROGRAM

## 2024-04-30 PROCEDURE — 7100000011 HC PHASE II RECOVERY - ADDTL 15 MIN: Performed by: STUDENT IN AN ORGANIZED HEALTH CARE EDUCATION/TRAINING PROGRAM

## 2024-04-30 PROCEDURE — 7100000010 HC PHASE II RECOVERY - FIRST 15 MIN: Performed by: STUDENT IN AN ORGANIZED HEALTH CARE EDUCATION/TRAINING PROGRAM

## 2024-04-30 PROCEDURE — 3600007512: Performed by: STUDENT IN AN ORGANIZED HEALTH CARE EDUCATION/TRAINING PROGRAM

## 2024-04-30 PROCEDURE — 6360000002 HC RX W HCPCS: Performed by: ANESTHESIOLOGY

## 2024-04-30 PROCEDURE — 2720000010 HC SURG SUPPLY STERILE: Performed by: STUDENT IN AN ORGANIZED HEALTH CARE EDUCATION/TRAINING PROGRAM

## 2024-04-30 PROCEDURE — 3600007502: Performed by: STUDENT IN AN ORGANIZED HEALTH CARE EDUCATION/TRAINING PROGRAM

## 2024-04-30 PROCEDURE — 2580000003 HC RX 258: Performed by: STUDENT IN AN ORGANIZED HEALTH CARE EDUCATION/TRAINING PROGRAM

## 2024-04-30 PROCEDURE — 2500000003 HC RX 250 WO HCPCS: Performed by: ANESTHESIOLOGY

## 2024-04-30 PROCEDURE — 2709999900 HC NON-CHARGEABLE SUPPLY: Performed by: STUDENT IN AN ORGANIZED HEALTH CARE EDUCATION/TRAINING PROGRAM

## 2024-04-30 PROCEDURE — 3700000000 HC ANESTHESIA ATTENDED CARE: Performed by: STUDENT IN AN ORGANIZED HEALTH CARE EDUCATION/TRAINING PROGRAM

## 2024-04-30 RX ORDER — SODIUM CHLORIDE 9 MG/ML
25 INJECTION, SOLUTION INTRAVENOUS PRN
Status: DISCONTINUED | OUTPATIENT
Start: 2024-04-30 | End: 2024-04-30 | Stop reason: HOSPADM

## 2024-04-30 RX ORDER — SODIUM CHLORIDE 0.9 % (FLUSH) 0.9 %
5-40 SYRINGE (ML) INJECTION PRN
Status: DISCONTINUED | OUTPATIENT
Start: 2024-04-30 | End: 2024-04-30 | Stop reason: HOSPADM

## 2024-04-30 RX ORDER — LIDOCAINE HYDROCHLORIDE 20 MG/ML
INJECTION, SOLUTION EPIDURAL; INFILTRATION; INTRACAUDAL; PERINEURAL PRN
Status: DISCONTINUED | OUTPATIENT
Start: 2024-04-30 | End: 2024-04-30 | Stop reason: SDUPTHER

## 2024-04-30 RX ORDER — SODIUM CHLORIDE 0.9 % (FLUSH) 0.9 %
5-40 SYRINGE (ML) INJECTION EVERY 12 HOURS SCHEDULED
Status: DISCONTINUED | OUTPATIENT
Start: 2024-04-30 | End: 2024-04-30 | Stop reason: HOSPADM

## 2024-04-30 RX ADMIN — LIDOCAINE HYDROCHLORIDE 40 MG: 20 INJECTION, SOLUTION EPIDURAL; INFILTRATION; INTRACAUDAL; PERINEURAL at 13:46

## 2024-04-30 RX ADMIN — SODIUM CHLORIDE 25 ML: 9 INJECTION, SOLUTION INTRAVENOUS at 13:10

## 2024-04-30 RX ADMIN — PROPOFOL 180 MG: 10 INJECTION, EMULSION INTRAVENOUS at 14:00

## 2024-04-30 ASSESSMENT — PAIN - FUNCTIONAL ASSESSMENT: PAIN_FUNCTIONAL_ASSESSMENT: NONE - DENIES PAIN

## 2024-04-30 NOTE — DISCHARGE INSTRUCTIONS
Chelsea Garcia  [id number]  1975    COLONOSCOPY DISCHARGE INSTRUCTIONS  Discomfort:  Redness at IV site- apply warm compress to area; if redness or soreness persist- contact your physician  There may be a slight amount of blood passed from the rectum  Gaseous discomfort- walking, belching will help relieve any discomfort  You may not operate a vehicle for 12 hours  You may not engage in an occupation involving machinery or appliances for rest of today  You may not drink alcoholic beverages for at least 12 hours  Avoid making any critical decisions for at least 24 hour  DIET:   Regular diet.   - however -  remember your colon is empty and a heavy meal will produce gas.   Avoid these foods:  vegetables, fried / greasy foods, carbonated drinks for today    BLOOD-THINNERS:     MEDICATION:  (See attached)     ACTIVITY:  You may not resume your normal daily activities until tomorrow AM; it is recommended that you spend the remainder of the day resting -  avoid any strenuous activity.    CALL M.D. WITH ANY SIGN OF:   Increasing pain, nausea, vomiting  Abdominal distension (swelling)  New increased bleeding (oral or rectal)  Fever (chills)  Pain in chest area  Bloody discharge from nose or mouth  Shortness of breath    You may  take any Advil, Aspirin, Ibuprofen, Motrin, or Tylenol as needed for pain.    IMPRESSION:    Impression:    Normal colonoscopy    Recommendations: --For colon cancer screening in this average-risk patient, colonoscopy may be repeated in 10 years.  -Follow up with primary care physician.   -High fiber diet.    -Resume normal medication(s).     Follow-up Instructions:  Please call Dr. Espinoza with other questions about the results of your procedure  Telephone #[phone]  Follow up with Dr. Espinoza in clinic if you have any new symptoms or issues  Additional instructions: Congratulations, your colonoscopy was normal! Great job.  You should continue to follow with your primary care

## 2024-04-30 NOTE — ANESTHESIA PRE PROCEDURE
Department of Anesthesiology  Preprocedure Note       Name:  Chelsea Garcia   Age:  49 y.o.  :  1975                                          MRN:  [id number]         Date:  2024      Surgeon: Surgeon(s):  Honey Espinoza MD    Procedure: Procedure(s):  COLONOSCOPY BIOPSY, POLYPECTOMY    Medications prior to admission:   Prior to Admission medications    Medication Sig Start Date End Date Taking? Authorizing Provider   hydroxychloroquine (PLAQUENIL) 200 MG tablet Take 1 tablet by mouth 2 times daily   Yes Provider, MD David   naproxen (NAPROSYN) 250 MG tablet Take 1 tablet by mouth 2 times daily as needed for Pain   Yes Provider, MD David   ibuprofen (ADVIL;MOTRIN) 600 MG tablet Take 1 tablet by mouth every 6 hours as needed for Pain   Yes Provider, MD David   cyclobenzaprine (FLEXERIL) 10 MG tablet Take 1 tablet by mouth 3 times daily as needed for Muscle spasms   Yes Provider, MD David   atorvastatin (LIPITOR) 10 MG tablet Take 10 mg by mouth daily 20   Automatic Reconciliation, Ar   vitamin D (CHOLECALCIFEROL) 25 MCG (1000 UT) TABS tablet Take by mouth daily    Automatic Reconciliation, Ar   clobetasol (TEMOVATE) 0.05 % cream clobetasol 0.05 % topical cream    Automatic Reconciliation, Ar   folic acid (FOLVITE) 1 MG tablet TAKE 3 TABLETS BY MOUTH ONCE DAILY 3/20/20   Automatic Reconciliation, Ar   methotrexate (RHEUMATREX) 2.5 MG chemo tablet TAKE 10 TABLETS BY MOUTH ONCE A WEEK 3/27/20   Automatic Reconciliation, Ar   montelukast (SINGULAIR) 10 MG tablet Take 1 tablet by mouth daily as needed 20   Automatic Reconciliation, Ar       Current medications:    No current facility-administered medications for this encounter.     Current Outpatient Medications   Medication Sig Dispense Refill   • hydroxychloroquine (PLAQUENIL) 200 MG tablet Take 1 tablet by mouth 2 times daily     • naproxen (NAPROSYN) 250 MG tablet Take 1 tablet by mouth 2 times daily as needed for

## 2024-04-30 NOTE — ANESTHESIA POSTPROCEDURE EVALUATION
Department of Anesthesiology  Postprocedure Note    Patient: Chelsea Garcia  MRN: [id number]  YOB: 1975  Date of evaluation: 4/30/2024    Procedure Summary       Date: 04/30/24 Room / Location: [facility] ENDO 03 / [facility] ENDOSCOPY    Anesthesia Start: 1340 Anesthesia Stop: 1401    Procedure: COLONOSCOPY BIOPSY, POLYPECTOMY (Lower GI Region) Diagnosis:       Screening for colon cancer      (Screening for colon cancer [Z12.11])    Surgeons: Honey Espinoza MD Responsible Provider: Kelly Irvin DO    Anesthesia Type: TIVA ASA Status: 2            Anesthesia Type: TIVA    Damien Phase I: Damien Score: 10    Damien Phase II: Damien Score: 10    Anesthesia Post Evaluation    Patient location during evaluation: PACU  Patient participation: complete - patient participated  Level of consciousness: awake  Airway patency: patent  Nausea & Vomiting: no vomiting  Cardiovascular status: hemodynamically stable  Respiratory status: acceptable  Hydration status: euvolemic    No notable events documented.

## 2024-04-30 NOTE — OP NOTE
[facility]                  Colonoscopy Operative Report    4/30/2024      Chelsea Garcia  [id number]  1975    Procedure Type:   Colonoscopy --screening     Indications:    Screening colonoscopy     Pre-operative Diagnosis: see indication above    Post-operative Diagnosis:  See findings below    :  Honey Espinoza MD    Referring Provider: Elyse Agrawal APRN - CNP      Sedation:  MAC anesthesia Propofol    Pre-Procedural Exam:      Airway: clear,  No airway problems anticipated  Heart: RRR, without gallops or rubs  Lungs: clear bilaterally without wheezes, crackles, or rhonchi  Abdomen: soft, nontender, nondistended, bowel sounds present  Mental Status: awake, alert and oriented to person, place and time     Procedure Details:  After informed consent was obtained with all risks and benefits of procedure explained and preoperative exam completed, the patient was taken to the endoscopy suite and placed in the left lateral decubitus position.  Upon sequential sedation as per above, a digital rectal exam was performed .  The Olympus videocolonoscope  was inserted in the rectum and carefully advanced to the terminal ileum.  The cecum was identified by the ileocecal valve and appendiceal orifice.  The quality of preparation was excellent BBPS 2+3+3=8.  The colonoscope was slowly withdrawn with careful evaluation between folds. Retroflexion in the rectum was completed demonstrating no abnormalities.    Findings:   Rectum: normal  Sigmoid: normal  Descending Colon: normal  Transverse Colon: normal  Ascending Colon: normal  Cecum: normal  Terminal Ileum: normal      Specimen Removed:  none    Complications: None.     EBL:  None.    Impression:     Normal colonoscopy    Recommendations: --For colon cancer screening in this average-risk patient, colonoscopy may be repeated in 10 years.  -Follow up with primary care physician.   -High fiber diet.    -Resume normal

## 2024-04-30 NOTE — H&P
Reconciliation, Ar   clobetasol (TEMOVATE) 0.05 % cream clobetasol 0.05 % topical cream    Automatic Reconciliation, Ar   folic acid (FOLVITE) 1 MG tablet TAKE 3 TABLETS BY MOUTH ONCE DAILY 3/20/20   Automatic Reconciliation, Ar   methotrexate (RHEUMATREX) 2.5 MG chemo tablet TAKE 10 TABLETS BY MOUTH ONCE A WEEK 3/27/20   Automatic Reconciliation, Ar   montelukast (SINGULAIR) 10 MG tablet Take 1 tablet by mouth daily as needed 4/13/20   Automatic Reconciliation, Ar       Physical Exam:   HEENT: Head: Normocephalic, no lesions, without obvious abnormality.  Mallampati III   Heart: regular rate and rhythm, S1, S2 normal   Lungs: chest clear, no wheezing, rales, normal symmetric air entry   Abdominal:  abdomen is soft, nontender, nondistended     I discussed the details of the procedure for Colonoscopy with the patient and the associated risks including sedation/airway complication, bleeding, perforation, splenic/hepatic injury, and missed lesion.  The patient provided consent and agreed with proceeding.    Signed:  Honey Espinoza MD 4/30/2024

## 2024-04-30 NOTE — PERIOP NOTE
Endoscopy Case End Note:    ***:  Procedure scope was pre-cleaned, per protocol, at bedside by Jad GARCIA      ***:  Report received from anesthesia - Dr Irvin.  See anesthesia flowsheet for intra-procedure vital signs and events.

## 2024-04-30 NOTE — PROGRESS NOTES
ARRIVAL INFORMATION:  Verified patient name and date of birth, scheduled procedure, and informed consent.     : Franko Moreno (Boyfriend)   [phone] (Home Phone)  Physician and staff can share information with the .     Belongings with patient include:  Clothing,None    GI FOCUSED ASSESSMENT:  Neuro: Awake, alert, oriented x4  Respiratory: even and unlabored   GI: soft and non-distended  EKG Rhythm: normal sinus rhythm    Education:Reviewed general discharge instructions and  information.

## (undated) DEVICE — ELECTRODE PT RET AD L9FT HI MOIST COND ADH HYDRGEL CORDED

## (undated) DEVICE — SNARE ENDOSCP POLYP MED STD AD 2.4X27X240 CM 2.8 MM OVL SENS

## (undated) DEVICE — IV START KIT: Brand: MEDLINE

## (undated) DEVICE — CUFF BLD PRSS AD CLTH SGL TB W/ BAYNT CONN ROUNDED CORNER

## (undated) DEVICE — FORCEPS BX L240CM JAW DIA2.4MM ORNG L CAP W/ NDL DISP RAD

## (undated) DEVICE — SET GRAV CK VLV NEEDLESS ST 3 GANGED 4WAY STPCOCK HI FLO 10

## (undated) DEVICE — TRAP ENDOSCP POLYP 2 CHMBR DRAWER TYP

## (undated) DEVICE — INJECTION THERAPY NEEDLE CATHETER: Brand: INTERJECT

## (undated) DEVICE — CONTAINER SPEC 20 ML LID NEUT BUFF FORMALIN 10 % POLYPR STS

## (undated) DEVICE — ENDOSCOPIC KIT COMPLIANCE ENDOKIT

## (undated) DEVICE — FIAPC® PROBE W/ FILTER 2200 A OD 2.3MM/6.9FR; L 2.2M/7.2FT: Brand: ERBE

## (undated) DEVICE — FIAPC® PROBE W/ FILTER 2200 C OD 2.3MM/6.9FR; L 2.2M/7.2FT: Brand: ERBE

## (undated) DEVICE — TIP SUCT TRNSPAR RIB SURF STD BLB RIG NVENT W/ 5IN1 CONN DYND50138] MEDLINE INDUSTRIES INC]